# Patient Record
Sex: FEMALE | Race: AMERICAN INDIAN OR ALASKA NATIVE
[De-identification: names, ages, dates, MRNs, and addresses within clinical notes are randomized per-mention and may not be internally consistent; named-entity substitution may affect disease eponyms.]

---

## 2019-01-22 ENCOUNTER — HOSPITAL ENCOUNTER (OUTPATIENT)
Dept: HOSPITAL 5 - SPVWC | Age: 69
Discharge: HOME | End: 2019-01-22
Attending: INTERNAL MEDICINE
Payer: MEDICARE

## 2019-01-22 DIAGNOSIS — R92.8: Primary | ICD-10-CM

## 2019-01-22 NOTE — MAMMOGRAPHY REPORT
RIGHT DIGITAL DIAGNOSTIC MAMMOGRAM : 01/22/19 08:38:00



CLINICAL: Recalled for asymmetry.



COMPARISON:01/07/19 screening



FINDINGS: LM and CC spot magnification views were performed and are 

negative.Satisfactory effacement of asymmetry.



IMPRESSION: Negative Mammogram.



BI-RADS CATEGORY:  1 -- Negative



RECOMMENDATION: Routine mammographic screening in one year.



ACR BI-RADS MAMMOGRAPHIC CODES:

0 = Needs additional imaging evaluation; 1 = Negative; 2 = Benign; 3 = 

Probably benign; 4 = Suspicious; 5 = Malignant; 6 = Known biopsy-proven 

malignancy



COMMENT:

      1.   Dense breast tissue, i.e., adenosis, fibrocystic 

            changes, etc., may obscure an underlying neoplasm.

      2.   Approximately 10% of cancers are not detected with

            mammography.

      3.   A negative mammography report should not delay biopsy 

            if a clinically suspicious mass is present.





COMMENT:

Patient follow-up letters are generated via our [employer] 

application.

## 2020-01-28 ENCOUNTER — HOSPITAL ENCOUNTER (OUTPATIENT)
Dept: HOSPITAL 5 - SPVWC | Age: 70
Discharge: HOME | End: 2020-01-28
Attending: INTERNAL MEDICINE
Payer: MEDICARE

## 2020-01-28 DIAGNOSIS — Z12.31: Primary | ICD-10-CM

## 2020-01-28 DIAGNOSIS — N64.89: ICD-10-CM

## 2020-01-28 PROCEDURE — 77067 SCR MAMMO BI INCL CAD: CPT

## 2020-01-28 NOTE — MAMMOGRAPHY REPORT
BILATERAL DIGITAL SCREENING MAMMOGRAM WITH CAD



INDICATION: Routine screening mammography. 



TECHNIQUE:  Digital bilateral  2D mammography was obtained in the craniocaudal and mediolateral obliq
ue projections. This examination was interpreted with the benefit of Computer-Aided Detection analysi
s.



COMPARISON: 1/22/2019, 1/7/2019, 11/3/2017, 11/2/2016.



FINDINGS: 



Breast Density: The breasts are almost entirely fatty.



No suspicious mass, microcalcifications, or architectural distortion.



IMPRESSION:



No evidence of breast malignancy. Recommend routine screening mammogram in one year.



BI-RADS Category 1:  Negative. No mammographic evidence of malignancy.  Recommend routine screening m
ammography in one year.



A "normal" or negative report should not discourage follow up or biopsy of a clinically significant f
inding.



A written summary of these findings will be mailed to the patient. The patient will be entered into a
 mammography reporting system which will generate a reminder letter for the patient's next appointmen
t at the appropriate interval.



The [employer] recommends yearly mammograms starting at age 40 and continuing as l
sybil as a woman is in good health.  Breast MRI is recommended for women with an approximate 20-25% or 
greater lifetime risk of breast cancer, including women with a strong family history of breast or ova
isaac cancer or who have been treated for Hodgkin's disease.



Signer Name: Wicho Gómez MD 

Signed: 1/28/2020 11:16 AM

 Workstation Name: [id number]

## 2021-06-16 ENCOUNTER — HOSPITAL ENCOUNTER (OUTPATIENT)
Dept: HOSPITAL 5 - SPVWC | Age: 71
Discharge: HOME | End: 2021-06-16
Attending: INTERNAL MEDICINE
Payer: MEDICARE

## 2021-06-16 DIAGNOSIS — Z12.31: Primary | ICD-10-CM

## 2021-06-16 DIAGNOSIS — N64.89: ICD-10-CM

## 2021-06-16 PROCEDURE — 77067 SCR MAMMO BI INCL CAD: CPT

## 2021-06-16 NOTE — MAMMOGRAPHY REPORT
DIGITAL SCREENING MAMMOGRAM WITH CAD, 6/16/2021



CLINICAL INFORMATION / INDICATION: Routine screening mammography. SCREENING MAMMO



TECHNIQUE:  Digital bilateral 2D mammography was obtained in the craniocaudal and mediolateral obliqu
e projections. This examination was interpreted with the benefit of Computer-Aided Detection analysis
.



COMPARISON: 01/28/20, 01/07/19



FINDINGS: 



Breast Density: The breasts are almost entirely fatty.



No dominant mass, suspicious calcifications, or architectural distortion in either breast. 





 

IMPRESSION: No mammographic evidence of malignancy.



Follow up recommendation: Routine yearly



BI-RADS Category 1:  Negative.





-------------------------------------------------------------------------------------------

A "normal" or negative report should not discourage follow up or biopsy of a clinically significant f
inding.



A written summary of these findings will be mailed to the patient. The patient will be entered into a
 mammography reporting system which will generate a reminder letter for the patient's next appointmen
t at the appropriate interval.



The [employer] recommends yearly mammograms starting at age 40 and continuing as l
sybil as a woman is in good health.  Breast MRI is recommended for women with an approximate 20-25% or 
greater lifetime risk of breast cancer, including women with a strong family history of breast or ova
isaac cancer or who have been treated for Hodgkin's disease.



Signer Name: ROBERTO Lazo MD 

Signed: 6/16/2021 2:22 PM

Workstation Name: [id number]

## 2022-07-20 ENCOUNTER — HOSPITAL ENCOUNTER (OUTPATIENT)
Dept: HOSPITAL 5 - SPVWC | Age: 72
Discharge: HOME | End: 2022-07-20
Attending: INTERNAL MEDICINE
Payer: MEDICARE

## 2022-07-20 DIAGNOSIS — Z12.31: Primary | ICD-10-CM

## 2022-07-20 PROCEDURE — 77067 SCR MAMMO BI INCL CAD: CPT

## 2022-07-22 NOTE — MAMMOGRAPHY REPORT
DIGITAL SCREENING MAMMOGRAM WITH CAD, 7/20/2022



CLINICAL INFORMATION / INDICATION: Routine screening mammography.



TECHNIQUE:  Digital bilateral 2D mammography was obtained in the craniocaudal and mediolateral obliqu
e projections. This examination was interpreted with the benefit of Computer-Aided Detection analysis
.



COMPARISON: 6/16/2021, 1/28/2020



FINDINGS: 



Breast Density: The breasts are almost entirely fatty.



No dominant mass, suspicious calcifications, or architectural distortion in either breast. 



No interval change.

 

IMPRESSION: No mammographic evidence of malignancy.



Follow up recommendation: Routine yearly screening mammogram.



BI-RADS Category 1:  NEGATIVE





-------------------------------------------------------------------------------------------

A "normal" or negative report should not discourage follow up or biopsy of a clinically significant f
inding.



A written summary of these findings will be mailed to the patient. The patient will be entered into a
 mammography reporting system which will generate a reminder letter for the patient's next appointmen
t at the appropriate interval.



The [employer] recommends yearly mammograms starting at age 40 and continuing as l
sybil as a woman is in good health.  Breast MRI is recommended for women with an approximate 20-25% or 
greater lifetime risk of breast cancer, including women with a strong family history of breast or ova
isaac cancer or who have been treated for Hodgkin's disease.



Signer Name: Sofía Sanchez MD 

Signed: 7/22/2022 3:03 PM

Workstation Name: [id number]